# Patient Record
Sex: FEMALE | URBAN - METROPOLITAN AREA
[De-identification: names, ages, dates, MRNs, and addresses within clinical notes are randomized per-mention and may not be internally consistent; named-entity substitution may affect disease eponyms.]

---

## 2017-06-05 ENCOUNTER — APPOINTMENT (RX ONLY)
Dept: URBAN - METROPOLITAN AREA CLINIC 81 | Facility: CLINIC | Age: 32
Setting detail: DERMATOLOGY
End: 2017-06-05

## 2017-06-05 DIAGNOSIS — L81.4 OTHER MELANIN HYPERPIGMENTATION: ICD-10-CM

## 2017-06-05 DIAGNOSIS — L91.0 HYPERTROPHIC SCAR: ICD-10-CM

## 2017-06-05 DIAGNOSIS — L81.6 OTHER DISORDERS OF DIMINISHED MELANIN FORMATION: ICD-10-CM

## 2017-06-05 DIAGNOSIS — T07XXXA ABRASION OR FRICTION BURN OF OTHER, MULTIPLE, AND UNSPECIFIED SITES, WITHOUT MENTION OF INFECTION: ICD-10-CM

## 2017-06-05 DIAGNOSIS — D22 MELANOCYTIC NEVI: ICD-10-CM

## 2017-06-05 DIAGNOSIS — Z41.9 ENCOUNTER FOR PROCEDURE FOR PURPOSES OTHER THAN REMEDYING HEALTH STATE, UNSPECIFIED: ICD-10-CM

## 2017-06-05 PROBLEM — T14.8 OTHER INJURY OF UNSPECIFIED BODY REGION: Status: ACTIVE | Noted: 2017-06-05

## 2017-06-05 PROBLEM — D22.5 MELANOCYTIC NEVI OF TRUNK: Status: ACTIVE | Noted: 2017-06-05

## 2017-06-05 PROCEDURE — ? MEDICAL CONSULTATION: Q SWITCHED LASER

## 2017-06-05 PROCEDURE — 99202 OFFICE O/P NEW SF 15 MIN: CPT

## 2017-06-05 PROCEDURE — ? TREATMENT REGIMEN

## 2017-06-05 PROCEDURE — ? COUNSELING

## 2017-06-05 PROCEDURE — ? OTHER (COSMETIC)

## 2017-06-05 PROCEDURE — ? MEDICAL CONSULTATION: PULSED-DYE LASER

## 2017-06-05 ASSESSMENT — LOCATION DETAILED DESCRIPTION DERM
LOCATION DETAILED: LEFT UPPER CUTANEOUS LIP
LOCATION DETAILED: INFERIOR MID FOREHEAD
LOCATION DETAILED: LEFT LATERAL BUCCAL CHEEK
LOCATION DETAILED: RIGHT LATERAL ABDOMEN

## 2017-06-05 ASSESSMENT — LOCATION SIMPLE DESCRIPTION DERM
LOCATION SIMPLE: LEFT CHEEK
LOCATION SIMPLE: ABDOMEN
LOCATION SIMPLE: INFERIOR FOREHEAD
LOCATION SIMPLE: LEFT LIP

## 2017-06-05 ASSESSMENT — LOCATION ZONE DERM
LOCATION ZONE: FACE
LOCATION ZONE: LIP
LOCATION ZONE: TRUNK

## 2017-06-05 NOTE — PROCEDURE: TREATMENT REGIMEN
Detail Level: Zone
Plan: The patient will have the site treated with the pulse dye laser and then treat with scar gel.\\nPatient to schedule appointment

## 2017-06-05 NOTE — HPI: SECONDARY COMPLAINT
How Severe Is This Condition?: mild
How Severe Is This Condition?: moderate
Additional History: the patient states she finds herself scratching  and picking at the areas

## 2017-06-05 NOTE — HPI: COSMETIC CONSULTATION
When Outside In The Sun, Do You...: mostly tans, rarely burns
Additional History: the patient  is here for treatment to a previously biopsied congenital nevus years ago, and was also treated with Yag laser.  She states the lesion has darkened.
Additional History: the area is a hy giving birthpopigmented area located on the left jaw that occurred after

## 2017-08-09 ENCOUNTER — APPOINTMENT (RX ONLY)
Dept: URBAN - METROPOLITAN AREA CLINIC 81 | Facility: CLINIC | Age: 32
Setting detail: DERMATOLOGY
End: 2017-08-09

## 2017-08-09 DIAGNOSIS — L91.0 HYPERTROPHIC SCAR: ICD-10-CM

## 2017-08-09 DIAGNOSIS — L81.4 OTHER MELANIN HYPERPIGMENTATION: ICD-10-CM

## 2017-08-09 PROCEDURE — ? PULSED-DYE LASER

## 2017-08-09 PROCEDURE — ? Q-SWITCHED LASER

## 2017-08-09 ASSESSMENT — LOCATION ZONE DERM
LOCATION ZONE: LIP
LOCATION ZONE: LIP

## 2017-08-09 ASSESSMENT — LOCATION SIMPLE DESCRIPTION DERM
LOCATION SIMPLE: LEFT LIP
LOCATION SIMPLE: LEFT LIP

## 2017-08-09 ASSESSMENT — LOCATION DETAILED DESCRIPTION DERM
LOCATION DETAILED: LEFT UPPER CUTANEOUS LIP
LOCATION DETAILED: LEFT UPPER CUTANEOUS LIP

## 2017-08-09 NOTE — PROCEDURE: PULSED-DYE LASER
Post-Procedure Care: Vaseline and ice applied. Post care reviewed with patient.
Cryogen Time (Ms): 30
Pulse Duration: 10 ms
Cryogen Time (Ms): 30
Delay Time (Ms): 20
Detail Level: Detailed
Laser Type: Vbeam 595nm
Pulse Count (Optional): 3
Consent: Written consent obtained, risks reviewed including but not limited to crusting, scabbing, blistering, scarring, darker or lighter pigmentary change, incidental hair removal, bruising, and/or incomplete removal.
Spot Size: 7 mm
Pulse Duration: 6 ms
Delay Time (Ms): 20
Location Override: left upper cutaneous lip
Post-Care Instructions: I reviewed with the patient in detail post-care instructions. Patient should stay away from the sun and wear sun protection until treated areas are fully healed.
Fluence In J/Cm2 (Optional): 7

## 2017-08-09 NOTE — PROCEDURE: Q-SWITCHED LASER
Frequency In Hz: 1
Spot Size In Mm: 2
Detail Level: Detailed
Spot Size In Mm: 3
Consent: Written consent obtained, risks reviewed including but not limited to crusting, scabbing, blistering, scarring, darker or lighter pigmentary change, systemic reactions, ulceration, incidental hair removal, bruising, and/or incomplete removal.
Endpoint: Immediate endpoint whitening and pinpoint bleeding. Vaseline and ice applied. Post care reviewed with patient.
Fluence: 3.5
Area In Cm^2: 0
Post-Care Instructions: I reviewed with the patient in detail post-care instructions. Patient should avoid sunlight and wear sun protection.
Anesthesia Type: 1% lidocaine with epinephrine

## 2017-10-12 ENCOUNTER — APPOINTMENT (RX ONLY)
Dept: URBAN - METROPOLITAN AREA CLINIC 81 | Facility: CLINIC | Age: 32
Setting detail: DERMATOLOGY
End: 2017-10-12

## 2017-10-12 DIAGNOSIS — L81.4 OTHER MELANIN HYPERPIGMENTATION: ICD-10-CM

## 2017-10-12 DIAGNOSIS — L91.0 HYPERTROPHIC SCAR: ICD-10-CM

## 2017-10-12 PROBLEM — D23.39 OTHER BENIGN NEOPLASM OF SKIN OF OTHER PARTS OF FACE: Status: ACTIVE | Noted: 2017-10-12

## 2017-10-12 PROCEDURE — ? COUNSELING

## 2017-10-12 PROCEDURE — ? TREATMENT REGIMEN

## 2017-10-12 ASSESSMENT — LOCATION ZONE DERM: LOCATION ZONE: LIP

## 2017-10-12 ASSESSMENT — LOCATION DETAILED DESCRIPTION DERM: LOCATION DETAILED: LEFT UPPER CUTANEOUS LIP

## 2017-10-12 ASSESSMENT — LOCATION SIMPLE DESCRIPTION DERM: LOCATION SIMPLE: LEFT LIP

## 2017-10-12 NOTE — PROCEDURE: TREATMENT REGIMEN
Otc Regimen: Gentle cleanser face wash
Plan: Will use hydroquinone if pigmentation persists.  Strongly recommended sun protection.
Detail Level: Zone
Samples Given: Nicho two times a day

## 2017-11-09 ENCOUNTER — APPOINTMENT (RX ONLY)
Dept: URBAN - METROPOLITAN AREA CLINIC 81 | Facility: CLINIC | Age: 32
Setting detail: DERMATOLOGY
End: 2017-11-09

## 2017-11-09 DIAGNOSIS — L81.4 OTHER MELANIN HYPERPIGMENTATION: ICD-10-CM

## 2017-11-09 DIAGNOSIS — L91.0 HYPERTROPHIC SCAR: ICD-10-CM

## 2017-11-09 PROCEDURE — ? COUNSELING

## 2017-11-09 PROCEDURE — ? TREATMENT REGIMEN

## 2017-11-09 ASSESSMENT — LOCATION SIMPLE DESCRIPTION DERM: LOCATION SIMPLE: LEFT LIP

## 2017-11-09 ASSESSMENT — SEVERITY ASSESSMENT: SEVERITY: MILD

## 2017-11-09 ASSESSMENT — SCAR ASSESSEMENT OVERALL: SCAR ASSESSMENT: 1 (FLAT SCAR, NO ERYTHEMA)

## 2017-11-09 ASSESSMENT — LOCATION ZONE DERM: LOCATION ZONE: LIP

## 2017-11-09 ASSESSMENT — LOCATION DETAILED DESCRIPTION DERM: LOCATION DETAILED: LEFT UPPER CUTANEOUS LIP

## 2017-11-09 NOTE — PROCEDURE: TREATMENT REGIMEN
Otc Regimen: Gentle cleanser face wash
Plan: Strongly recommended sun protection.\\n\\nAlso discussed Fraxel laser treatment
Discontinue Regimen: Elidel cream
Initiate Treatment: Hydroquinone 4% with Green tree extract apply QHS 15 gm with 1 refill to [facility]
Detail Level: Zone

## 2018-01-18 ENCOUNTER — APPOINTMENT (RX ONLY)
Dept: URBAN - METROPOLITAN AREA CLINIC 81 | Facility: CLINIC | Age: 33
Setting detail: DERMATOLOGY
End: 2018-01-18

## 2018-01-18 DIAGNOSIS — L90.5 SCAR CONDITIONS AND FIBROSIS OF SKIN: ICD-10-CM

## 2018-01-18 DIAGNOSIS — L81.4 OTHER MELANIN HYPERPIGMENTATION: ICD-10-CM

## 2018-01-18 PROBLEM — D23.39 OTHER BENIGN NEOPLASM OF SKIN OF OTHER PARTS OF FACE: Status: ACTIVE | Noted: 2018-01-18

## 2018-01-18 PROCEDURE — ? BENIGN DESTRUCTION

## 2018-01-18 PROCEDURE — 17110 DESTRUCTION B9 LES UP TO 14: CPT

## 2018-01-18 PROCEDURE — ? TREATMENT REGIMEN

## 2018-01-18 PROCEDURE — 99213 OFFICE O/P EST LOW 20 MIN: CPT | Mod: 25

## 2018-01-18 PROCEDURE — ? COUNSELING

## 2018-01-18 ASSESSMENT — LOCATION ZONE DERM
LOCATION ZONE: FACE
LOCATION ZONE: LIP
LOCATION ZONE: FACE

## 2018-01-18 ASSESSMENT — LOCATION DETAILED DESCRIPTION DERM
LOCATION DETAILED: LEFT UPPER CUTANEOUS LIP
LOCATION DETAILED: LEFT INFERIOR MEDIAL MALAR CHEEK
LOCATION DETAILED: LEFT SUPERIOR CENTRAL BUCCAL CHEEK
LOCATION DETAILED: LEFT INFERIOR MEDIAL MALAR CHEEK

## 2018-01-18 ASSESSMENT — LOCATION SIMPLE DESCRIPTION DERM
LOCATION SIMPLE: LEFT CHEEK
LOCATION SIMPLE: LEFT CHEEK
LOCATION SIMPLE: LEFT LIP

## 2018-01-18 NOTE — PROCEDURE: TREATMENT REGIMEN
Detail Level: Zone
Plan: DC hydroquinone\\nDiscussed Fraxel laser 1929 nm
Plan: Stratoderm scar cream

## 2018-01-18 NOTE — PROCEDURE: BENIGN DESTRUCTION
Consent: The patient's consent was obtained including but not limited to risks of crusting, scabbing, blistering, scarring, darker or lighter pigmentary change, recurrence, incomplete removal and infection.
Treatment Number (Will Not Render If 0): 0
Render Post-Care Instructions In Note?: no
Medical Necessity Information: It is in your best interest to select a reason for this procedure from the list below. All of these items fulfill various CMS LCD requirements except the new and changing color options.
Detail Level: Detailed
Medical Necessity Clause: This procedure was medically necessary because the lesions that were treated were:
Anesthesia Volume In Cc: 0.5
Post-Care Instructions: I reviewed with the patient in detail post-care instructions. Patient is to wear sunprotection, and avoid picking at any of the treated lesions. Pt may apply Vaseline to crusted or scabbing areas.

## 2018-03-01 ENCOUNTER — APPOINTMENT (RX ONLY)
Dept: URBAN - METROPOLITAN AREA CLINIC 81 | Facility: CLINIC | Age: 33
Setting detail: DERMATOLOGY
End: 2018-03-01

## 2018-03-01 DIAGNOSIS — L90.5 SCAR CONDITIONS AND FIBROSIS OF SKIN: ICD-10-CM

## 2018-03-01 DIAGNOSIS — L81.4 OTHER MELANIN HYPERPIGMENTATION: ICD-10-CM

## 2018-03-01 PROCEDURE — 99212 OFFICE O/P EST SF 10 MIN: CPT

## 2018-03-01 PROCEDURE — ? TREATMENT REGIMEN

## 2018-03-01 ASSESSMENT — LOCATION ZONE DERM
LOCATION ZONE: FACE
LOCATION ZONE: LIP
LOCATION ZONE: FACE

## 2018-03-01 ASSESSMENT — LOCATION SIMPLE DESCRIPTION DERM
LOCATION SIMPLE: LEFT LIP
LOCATION SIMPLE: LEFT CHEEK
LOCATION SIMPLE: LEFT CHEEK

## 2018-03-01 ASSESSMENT — LOCATION DETAILED DESCRIPTION DERM
LOCATION DETAILED: LEFT SUPERIOR CENTRAL BUCCAL CHEEK
LOCATION DETAILED: LEFT INFERIOR MEDIAL MALAR CHEEK
LOCATION DETAILED: LEFT UPPER CUTANEOUS LIP
LOCATION DETAILED: LEFT INFERIOR MEDIAL MALAR CHEEK

## 2018-03-01 NOTE — PROCEDURE: TREATMENT REGIMEN
Plan: Discussed Fraxel laser 8389 nm
Continue Regimen: Hydroquinone 4% w/ green tea extract 15 gm QHS for 30 days.
Detail Level: Simple
Continue Regimen: Stratoderm scar cream 30 days.

## 2018-04-03 ENCOUNTER — APPOINTMENT (RX ONLY)
Dept: URBAN - METROPOLITAN AREA CLINIC 81 | Facility: CLINIC | Age: 33
Setting detail: DERMATOLOGY
End: 2018-04-03

## 2018-04-03 DIAGNOSIS — L90.5 SCAR CONDITIONS AND FIBROSIS OF SKIN: ICD-10-CM

## 2018-04-03 DIAGNOSIS — L81.4 OTHER MELANIN HYPERPIGMENTATION: ICD-10-CM

## 2018-04-03 PROCEDURE — ? FRAXEL

## 2018-04-03 PROCEDURE — ? TREATMENT REGIMEN

## 2018-04-03 ASSESSMENT — LOCATION DETAILED DESCRIPTION DERM
LOCATION DETAILED: LEFT UPPER CUTANEOUS LIP
LOCATION DETAILED: LEFT INFERIOR MEDIAL MALAR CHEEK
LOCATION DETAILED: LEFT SUPERIOR CENTRAL BUCCAL CHEEK
LOCATION DETAILED: LEFT UPPER CUTANEOUS LIP
LOCATION DETAILED: RIGHT UPPER CUTANEOUS LIP

## 2018-04-03 ASSESSMENT — LOCATION SIMPLE DESCRIPTION DERM
LOCATION SIMPLE: LEFT CHEEK
LOCATION SIMPLE: LEFT LIP
LOCATION SIMPLE: LEFT CHEEK
LOCATION SIMPLE: LEFT LIP
LOCATION SIMPLE: RIGHT LIP

## 2018-04-03 ASSESSMENT — LOCATION ZONE DERM
LOCATION ZONE: FACE
LOCATION ZONE: LIP
LOCATION ZONE: LIP
LOCATION ZONE: FACE

## 2018-04-03 NOTE — PROCEDURE: FRAXEL
Number Of Passes: 1
External Cooling: Dolly Cryo 5
Detail Level: Zone
Number Of Passes: 8
Add Post-Care Below To The Note: No
Topical Anesthesia Type: 20% benzocaine, 8% lidocaine, 4% tetracaine
Energy(Mj/Cm2): 10
External Cooling Fan Speed: 5
Post-Care Instructions: I reviewed with the patient in detail post-care instructions. Patient should avoid sun until area fully healed.
Wavelength: 1927nm
Consent: Written consent obtained, risks reviewed including but not limited to pain and incomplete improvement.
Location: upper cutaneous lip
Depth In Microns (Use Numbers Only, No Special Characters Or $): 196
Total Energy In Kj (Optional- Don't Include Units): .06
Length Of Topical Anesthesia Application (Optional): 30 minutes
Anesthesia Type: 1% lidocaine with epinephrine
Total Coverage: 35%
Indication: traumatic scar
Treatment Level: 4

## 2023-05-22 ENCOUNTER — OFFICE VISIT (OUTPATIENT)
Age: 38
End: 2023-05-22
Payer: COMMERCIAL

## 2023-05-22 ENCOUNTER — NURSE ONLY (OUTPATIENT)
Age: 38
End: 2023-05-22

## 2023-05-22 VITALS
DIASTOLIC BLOOD PRESSURE: 69 MMHG | WEIGHT: 142 LBS | BODY MASS INDEX: 25.16 KG/M2 | TEMPERATURE: 98.3 F | RESPIRATION RATE: 18 BRPM | HEART RATE: 61 BPM | HEIGHT: 63 IN | OXYGEN SATURATION: 97 % | SYSTOLIC BLOOD PRESSURE: 128 MMHG

## 2023-05-22 DIAGNOSIS — K21.9 GASTROESOPHAGEAL REFLUX DISEASE, UNSPECIFIED WHETHER ESOPHAGITIS PRESENT: ICD-10-CM

## 2023-05-22 DIAGNOSIS — F41.9 ANXIETY: ICD-10-CM

## 2023-05-22 DIAGNOSIS — Z13.0 SCREENING FOR ENDOCRINE, METABOLIC AND IMMUNITY DISORDER: ICD-10-CM

## 2023-05-22 DIAGNOSIS — E55.9 VITAMIN D DEFICIENCY: ICD-10-CM

## 2023-05-22 DIAGNOSIS — Z12.4 SCREENING FOR CERVICAL CANCER: Primary | ICD-10-CM

## 2023-05-22 DIAGNOSIS — R53.83 OTHER FATIGUE: ICD-10-CM

## 2023-05-22 DIAGNOSIS — Z13.29 SCREENING FOR ENDOCRINE, METABOLIC AND IMMUNITY DISORDER: ICD-10-CM

## 2023-05-22 DIAGNOSIS — Z13.228 SCREENING FOR ENDOCRINE, METABOLIC AND IMMUNITY DISORDER: ICD-10-CM

## 2023-05-22 DIAGNOSIS — E53.8 B12 DEFICIENCY: ICD-10-CM

## 2023-05-22 PROCEDURE — 99203 OFFICE O/P NEW LOW 30 MIN: CPT | Performed by: INTERNAL MEDICINE

## 2023-05-22 RX ORDER — SERTRALINE HYDROCHLORIDE 100 MG/1
100 TABLET, FILM COATED ORAL DAILY
COMMUNITY

## 2023-05-22 RX ORDER — OMEPRAZOLE 20 MG/1
20 TABLET, DELAYED RELEASE ORAL DAILY
Qty: 90 TABLET | Refills: 1 | Status: SHIPPED | OUTPATIENT
Start: 2023-05-22 | End: 2023-08-20

## 2023-05-22 RX ORDER — ALPRAZOLAM 0.5 MG/1
0.5 TABLET ORAL AS NEEDED
COMMUNITY

## 2023-05-22 SDOH — ECONOMIC STABILITY: HOUSING INSECURITY
IN THE LAST 12 MONTHS, WAS THERE A TIME WHEN YOU DID NOT HAVE A STEADY PLACE TO SLEEP OR SLEPT IN A SHELTER (INCLUDING NOW)?: NO

## 2023-05-22 SDOH — ECONOMIC STABILITY: INCOME INSECURITY: HOW HARD IS IT FOR YOU TO PAY FOR THE VERY BASICS LIKE FOOD, HOUSING, MEDICAL CARE, AND HEATING?: NOT HARD AT ALL

## 2023-05-22 SDOH — ECONOMIC STABILITY: FOOD INSECURITY: WITHIN THE PAST 12 MONTHS, THE FOOD YOU BOUGHT JUST DIDN'T LAST AND YOU DIDN'T HAVE MONEY TO GET MORE.: NEVER TRUE

## 2023-05-22 SDOH — ECONOMIC STABILITY: FOOD INSECURITY: WITHIN THE PAST 12 MONTHS, YOU WORRIED THAT YOUR FOOD WOULD RUN OUT BEFORE YOU GOT MONEY TO BUY MORE.: NEVER TRUE

## 2023-05-22 ASSESSMENT — PATIENT HEALTH QUESTIONNAIRE - PHQ9
2. FEELING DOWN, DEPRESSED OR HOPELESS: 0
SUM OF ALL RESPONSES TO PHQ QUESTIONS 1-9: 0
SUM OF ALL RESPONSES TO PHQ9 QUESTIONS 1 & 2: 0
SUM OF ALL RESPONSES TO PHQ QUESTIONS 1-9: 0
1. LITTLE INTEREST OR PLEASURE IN DOING THINGS: 0

## 2023-05-22 ASSESSMENT — ENCOUNTER SYMPTOMS
RESPIRATORY NEGATIVE: 1
EYES NEGATIVE: 1
GASTROINTESTINAL NEGATIVE: 1
ALLERGIC/IMMUNOLOGIC NEGATIVE: 1

## 2023-05-22 NOTE — PROGRESS NOTES
Subjective:   Monae Luevano was seen today for New Patient    Patient is here for establishing care. Patient does not have any symptoms suggestive of cardiac/pulmonary/ conditions. Patient has a history of anxiety for more than 20 years. Patient follows with psych, stable on Zoloft 100 mg once a day. Patient also has a history of GERD, takes Prilosec on and off in evening. Patient has a strong family history of lung CA in grandfather and grandmother. Patient smokes marijuana 1 joint per day. Does not consume alcohol. Patient works at [employer], exercises 3 to 4 days a week. Patient received 1 series injection of COVID-vaccine. Past Medical History:   Diagnosis Date    ADHD     Anxiety     Depression        Past Surgical History:   Procedure Laterality Date    CHOLECYSTECTOMY         Family History   Problem Relation Age of Onset    Alcohol Abuse Mother        Social History     Socioeconomic History    Marital status: Single     Spouse name: Not on file    Number of children: Not on file    Years of education: Not on file    Highest education level: Not on file   Occupational History    Not on file   Tobacco Use    Smoking status: Never    Smokeless tobacco: Never   Vaping Use    Vaping Use: Never used   Substance and Sexual Activity    Alcohol use: Not Currently    Drug use: Never    Sexual activity: Yes     Partners: Male   Other Topics Concern    Not on file   Social History Narrative    Not on file     Social Determinants of Health     Financial Resource Strain: Low Risk     Difficulty of Paying Living Expenses: Not hard at all   Food Insecurity: No Food Insecurity    Worried About Running Out of Food in the Last Year: Never true    [address] in the Last Year: Never true   Transportation Needs: Unknown    Lack of Transportation (Medical): Not on file    Lack of Transportation (Non-Medical):  No   Physical Activity: Not on file   Stress: Not on file   Social Connections: Not on file

## 2023-05-22 NOTE — PROGRESS NOTES
Kelsie Centeno presents today for   Chief Complaint   Patient presents with    New Patient                 1. \"Have you been to the ER, urgent care clinic since your last visit? Hospitalized since your last visit? \" no    2. \"Have you seen or consulted any other health care providers outside of the [address] [place] [address] since your last visit? \" no     3. For patients aged 39-70: Has the patient had a colonoscopy / FIT/ Cologuard? NA - based on age      If the patient is female:    4. For patients aged 41-77: Has the patient had a mammogram within the past 2 years? NA - based on age or sex      11. For patients aged 21-65: Has the patient had a pap smear? Yes - Care Gap present.  Rooming MA/LPN to request most recent results

## 2023-05-23 LAB
25(OH)D3+25(OH)D2 SERPL-MCNC: 21.5 NG/ML (ref 30–100)
ALBUMIN SERPL-MCNC: 5.1 G/DL (ref 3.8–4.8)
ALBUMIN/GLOB SERPL: 1.8 {RATIO} (ref 1.2–2.2)
ALP SERPL-CCNC: 65 IU/L (ref 44–121)
ALT SERPL-CCNC: 10 IU/L (ref 0–32)
AST SERPL-CCNC: 12 IU/L (ref 0–40)
BASOPHILS # BLD AUTO: 0.1 X10E3/UL (ref 0–0.2)
BASOPHILS NFR BLD AUTO: 1 %
BILIRUB SERPL-MCNC: <0.2 MG/DL (ref 0–1.2)
BUN SERPL-MCNC: 6 MG/DL (ref 6–20)
BUN/CREAT SERPL: 10 (ref 9–23)
CALCIUM SERPL-MCNC: 9.7 MG/DL (ref 8.7–10.2)
CHLORIDE SERPL-SCNC: 99 MMOL/L (ref 96–106)
CHOLEST SERPL-MCNC: 168 MG/DL (ref 100–199)
CO2 SERPL-SCNC: 24 MMOL/L (ref 20–29)
CREAT SERPL-MCNC: 0.61 MG/DL (ref 0.57–1)
EGFRCR SERPLBLD CKD-EPI 2021: 117 ML/MIN/1.73
EOSINOPHIL # BLD AUTO: 0.3 X10E3/UL (ref 0–0.4)
EOSINOPHIL NFR BLD AUTO: 4 %
ERYTHROCYTE [DISTWIDTH] IN BLOOD BY AUTOMATED COUNT: 12.8 % (ref 11.7–15.4)
GLOBULIN SER CALC-MCNC: 2.8 G/DL (ref 1.5–4.5)
GLUCOSE SERPL-MCNC: 83 MG/DL (ref 70–99)
HBA1C MFR BLD: 5.3 % (ref 4.8–5.6)
HCT VFR BLD AUTO: 42.1 % (ref 34–46.6)
HDLC SERPL-MCNC: 53 MG/DL
HGB BLD-MCNC: 14 G/DL (ref 11.1–15.9)
IMM GRANULOCYTES # BLD AUTO: 0 X10E3/UL (ref 0–0.1)
IMM GRANULOCYTES NFR BLD AUTO: 0 %
LDLC SERPL CALC-MCNC: 88 MG/DL (ref 0–99)
LYMPHOCYTES # BLD AUTO: 2.5 X10E3/UL (ref 0.7–3.1)
LYMPHOCYTES NFR BLD AUTO: 36 %
MCH RBC QN AUTO: 29 PG (ref 26.6–33)
MCHC RBC AUTO-ENTMCNC: 33.3 G/DL (ref 31.5–35.7)
MCV RBC AUTO: 87 FL (ref 79–97)
MONOCYTES # BLD AUTO: 0.6 X10E3/UL (ref 0.1–0.9)
MONOCYTES NFR BLD AUTO: 8 %
NEUTROPHILS # BLD AUTO: 3.5 X10E3/UL (ref 1.4–7)
NEUTROPHILS NFR BLD AUTO: 51 %
PLATELET # BLD AUTO: 304 X10E3/UL (ref 150–450)
POTASSIUM SERPL-SCNC: 4.5 MMOL/L (ref 3.5–5.2)
PROT SERPL-MCNC: 7.9 G/DL (ref 6–8.5)
RBC # BLD AUTO: 4.82 X10E6/UL (ref 3.77–5.28)
SODIUM SERPL-SCNC: 138 MMOL/L (ref 134–144)
T4 FREE SERPL-MCNC: 1.54 NG/DL (ref 0.82–1.77)
TRIGL SERPL-MCNC: 158 MG/DL (ref 0–149)
TSH SERPL DL<=0.005 MIU/L-ACNC: 1.13 UIU/ML (ref 0.45–4.5)
VIT B12 SERPL-MCNC: 1442 PG/ML (ref 232–1245)
VLDLC SERPL CALC-MCNC: 27 MG/DL (ref 5–40)
WBC # BLD AUTO: 6.9 X10E3/UL (ref 3.4–10.8)

## 2023-06-21 PROBLEM — Z12.4 SCREENING FOR CERVICAL CANCER: Status: RESOLVED | Noted: 2023-05-22 | Resolved: 2023-06-21

## 2023-06-21 PROBLEM — Z13.0 SCREENING FOR ENDOCRINE, METABOLIC AND IMMUNITY DISORDER: Status: RESOLVED | Noted: 2023-05-22 | Resolved: 2023-06-21

## 2023-06-21 PROBLEM — Z13.228 SCREENING FOR ENDOCRINE, METABOLIC AND IMMUNITY DISORDER: Status: RESOLVED | Noted: 2023-05-22 | Resolved: 2023-06-21

## 2023-06-21 PROBLEM — Z13.29 SCREENING FOR ENDOCRINE, METABOLIC AND IMMUNITY DISORDER: Status: RESOLVED | Noted: 2023-05-22 | Resolved: 2023-06-21

## 2023-11-20 ENCOUNTER — OFFICE VISIT (OUTPATIENT)
Age: 38
End: 2023-11-20
Payer: MEDICAID

## 2023-11-20 VITALS
SYSTOLIC BLOOD PRESSURE: 120 MMHG | WEIGHT: 145 LBS | DIASTOLIC BLOOD PRESSURE: 81 MMHG | RESPIRATION RATE: 18 BRPM | BODY MASS INDEX: 25.69 KG/M2 | OXYGEN SATURATION: 99 % | TEMPERATURE: 97 F | HEART RATE: 104 BPM | HEIGHT: 63 IN

## 2023-11-20 DIAGNOSIS — E55.9 VITAMIN D DEFICIENCY: ICD-10-CM

## 2023-11-20 DIAGNOSIS — G56.01 CARPAL TUNNEL SYNDROME OF RIGHT WRIST: Primary | ICD-10-CM

## 2023-11-20 PROCEDURE — 99214 OFFICE O/P EST MOD 30 MIN: CPT | Performed by: INTERNAL MEDICINE

## 2023-11-20 RX ORDER — ERGOCALCIFEROL 1.25 MG/1
50000 CAPSULE ORAL WEEKLY
Qty: 12 CAPSULE | Refills: 1 | Status: SHIPPED | OUTPATIENT
Start: 2023-11-20

## 2023-11-20 SDOH — ECONOMIC STABILITY: FOOD INSECURITY: WITHIN THE PAST 12 MONTHS, YOU WORRIED THAT YOUR FOOD WOULD RUN OUT BEFORE YOU GOT MONEY TO BUY MORE.: NEVER TRUE

## 2023-11-20 SDOH — ECONOMIC STABILITY: FOOD INSECURITY: WITHIN THE PAST 12 MONTHS, THE FOOD YOU BOUGHT JUST DIDN'T LAST AND YOU DIDN'T HAVE MONEY TO GET MORE.: NEVER TRUE

## 2023-11-20 SDOH — ECONOMIC STABILITY: INCOME INSECURITY: HOW HARD IS IT FOR YOU TO PAY FOR THE VERY BASICS LIKE FOOD, HOUSING, MEDICAL CARE, AND HEATING?: NOT HARD AT ALL

## 2023-11-20 ASSESSMENT — PATIENT HEALTH QUESTIONNAIRE - PHQ9
1. LITTLE INTEREST OR PLEASURE IN DOING THINGS: 0
SUM OF ALL RESPONSES TO PHQ QUESTIONS 1-9: 0
2. FEELING DOWN, DEPRESSED OR HOPELESS: 0
SUM OF ALL RESPONSES TO PHQ9 QUESTIONS 1 & 2: 0
SUM OF ALL RESPONSES TO PHQ QUESTIONS 1-9: 0

## 2023-11-20 NOTE — PROGRESS NOTES
Jose Lou presents today for No chief complaint on file. C/o loosing strength in right hand            1. \"Have you been to the ER, urgent care clinic since your last visit? Hospitalized since your last visit? \" no    2. \"Have you seen or consulted any other health care providers outside of the [address] [place] [address] since your last visit? \" no     3. For patients aged 43-73: Has the patient had a colonoscopy / FIT/ Cologuard? NA - based on age      If the patient is female:    4. For patients aged 43-66: Has the patient had a mammogram within the past 2 years? NA - based on age or sex      11. For patients aged 21-65: Has the patient had a pap smear?  Yes - no Care Gap present

## 2023-12-11 ENCOUNTER — TELEPHONE (OUTPATIENT)
Age: 38
End: 2023-12-11

## 2023-12-11 NOTE — TELEPHONE ENCOUNTER
Patient called and stated she was reaching into the fridge at work on 12/7/23 and she said she began getting pain in her shoulder and left hand shortly after. Patient states that she now has numbness in that hand as well. Patient states the pain is keeping her awake at night in the left hand. Patient has an appointment with Dr. Karthik Lorenz in January for her right hand but this is something new to her. Patient states she has tried motrin and tylenol for the pain. Please advise. Offered patient appointment with Dr. Fernando Yang for tomorrow but patient states that she is not sure that she can wait because she is in pain.

## 2023-12-11 NOTE — TELEPHONE ENCOUNTER
Patient states she hurt her arm Thursday and states she is losing the function of her arm and she is in so much pain. Patient was advised if she is unable to  her arm she should go to the ER for evaluation.

## 2023-12-21 PROBLEM — M54.12 CERVICAL RADICULOPATHY: Status: ACTIVE | Noted: 2023-12-21

## 2024-01-03 ENCOUNTER — HOSPITAL ENCOUNTER (EMERGENCY)
Facility: HOSPITAL | Age: 39
Discharge: HOME | End: 2024-01-03
Attending: EMERGENCY MEDICINE
Payer: MEDICAID

## 2024-01-03 VITALS
WEIGHT: 145 LBS | SYSTOLIC BLOOD PRESSURE: 128 MMHG | HEIGHT: 62 IN | HEART RATE: 91 BPM | BODY MASS INDEX: 26.68 KG/M2 | DIASTOLIC BLOOD PRESSURE: 75 MMHG | TEMPERATURE: 98 F | RESPIRATION RATE: 17 BRPM | OXYGEN SATURATION: 99 %

## 2024-01-03 DIAGNOSIS — J20.9 ACUTE BRONCHITIS, UNSPECIFIED ORGANISM: Primary | ICD-10-CM

## 2024-01-03 LAB
FLUAV AG NPH QL IA: NEGATIVE
FLUBV AG NOSE QL IA: NEGATIVE
SARS-COV-2 RDRP RESP QL NAA+PROBE: NOT DETECTED
SOURCE: NORMAL

## 2024-01-03 PROCEDURE — 6370000000 HC RX 637 (ALT 250 FOR IP): Performed by: EMERGENCY MEDICINE

## 2024-01-03 PROCEDURE — 87804 INFLUENZA ASSAY W/OPTIC: CPT

## 2024-01-03 PROCEDURE — 99283 EMERGENCY DEPT VISIT LOW MDM: CPT

## 2024-01-03 PROCEDURE — 87635 SARS-COV-2 COVID-19 AMP PRB: CPT

## 2024-01-03 RX ORDER — ALBUTEROL SULFATE 90 UG/1
2 AEROSOL, METERED RESPIRATORY (INHALATION) 4 TIMES DAILY PRN
Qty: 18 G | Refills: 0 | Status: SHIPPED | OUTPATIENT
Start: 2024-01-03

## 2024-01-03 RX ORDER — PREDNISONE 20 MG/1
60 TABLET ORAL
Status: COMPLETED | OUTPATIENT
Start: 2024-01-03 | End: 2024-01-03

## 2024-01-03 RX ORDER — IPRATROPIUM BROMIDE AND ALBUTEROL SULFATE 2.5; .5 MG/3ML; MG/3ML
1 SOLUTION RESPIRATORY (INHALATION)
Status: COMPLETED | OUTPATIENT
Start: 2024-01-03 | End: 2024-01-03

## 2024-01-03 RX ORDER — PREDNISONE 50 MG/1
50 TABLET ORAL DAILY
Qty: 5 TABLET | Refills: 0 | Status: SHIPPED | OUTPATIENT
Start: 2024-01-04 | End: 2024-01-09

## 2024-01-03 RX ORDER — BENZONATATE 100 MG/1
100 CAPSULE ORAL 2 TIMES DAILY PRN
Qty: 14 CAPSULE | Refills: 0 | Status: SHIPPED | OUTPATIENT
Start: 2024-01-03 | End: 2024-01-10

## 2024-01-03 RX ADMIN — PREDNISONE 60 MG: 20 TABLET ORAL at 09:31

## 2024-01-03 RX ADMIN — IPRATROPIUM BROMIDE AND ALBUTEROL SULFATE 1 DOSE: .5; 3 SOLUTION RESPIRATORY (INHALATION) at 09:31

## 2024-01-03 ASSESSMENT — PAIN - FUNCTIONAL ASSESSMENT: PAIN_FUNCTIONAL_ASSESSMENT: 0-10

## 2024-01-03 ASSESSMENT — PAIN SCALES - GENERAL: PAINLEVEL_OUTOF10: 3

## 2024-01-03 NOTE — ED PROVIDER NOTES
EMERGENCY DEPARTMENT HISTORY AND PHYSICAL EXAM      Date: 1/3/2024  Patient Name: Celsa Garcia      History of Presenting Illness     Chief Complaint   Patient presents with    Nasal Congestion    Cough       Location/Duration/Severity/Modifying factors   Chief Complaint   Patient presents with    Nasal Congestion    Cough       HPI:  Celsa Garcia is a 38 y.o. female with no significant past medical history who presents with myalgias, cough, nasal congestion. Pt  denies fever, chills, chest pain, vomiting, diarrhea.    PCP: Erick Kramer MD    No current facility-administered medications for this encounter.     Current Outpatient Medications   Medication Sig Dispense Refill    [START ON 1/4/2024] predniSONE (DELTASONE) 50 MG tablet Take 1 tablet by mouth daily for 5 days 5 tablet 0    albuterol sulfate HFA (VENTOLIN HFA) 108 (90 Base) MCG/ACT inhaler Inhale 2 puffs into the lungs 4 times daily as needed (wheezing, sob, cough) 18 g 0    benzonatate (TESSALON) 100 MG capsule Take 1 capsule by mouth 2 times daily as needed for Cough 14 capsule 0    omeprazole (PRILOSEC OTC) 20 MG tablet Take 1 tablet by mouth daily 30 tablet 3    acetaminophen (TYLENOL) 500 MG tablet Take 1 tablet by mouth every 6 hours as needed for Pain 30 tablet 1    vitamin D (ERGOCALCIFEROL) 1.25 MG (93090 UT) CAPS capsule Take 1 capsule by mouth once a week 12 capsule 1    sertraline (ZOLOFT) 100 MG tablet Take 1 tablet by mouth daily Patient is taking 75 mg Q D      ALPRAZolam (XANAX) 0.5 MG tablet Take 1 tablet by mouth as needed for Sleep.         Past History     Past Medical History:  Past Medical History:   Diagnosis Date    ADHD     Anxiety     Depression        Past Surgical History:  Past Surgical History:   Procedure Laterality Date    CHOLECYSTECTOMY         Family History:  Family History   Problem Relation Age of Onset    Alcohol Abuse Mother        Social History:  Social History     Tobacco Use    Smoking status: Never

## 2024-01-12 ENCOUNTER — OFFICE VISIT (OUTPATIENT)
Age: 39
End: 2024-01-12
Payer: MEDICAID

## 2024-01-12 VITALS
HEIGHT: 62 IN | HEART RATE: 86 BPM | OXYGEN SATURATION: 96 % | TEMPERATURE: 98 F | WEIGHT: 147 LBS | BODY MASS INDEX: 27.05 KG/M2

## 2024-01-12 DIAGNOSIS — M54.12 CERVICAL NEURITIS: Primary | ICD-10-CM

## 2024-01-12 DIAGNOSIS — M47.812 CERVICAL SPONDYLOSIS WITHOUT MYELOPATHY: ICD-10-CM

## 2024-01-12 DIAGNOSIS — S16.1XXA STRAIN OF NECK MUSCLE, INITIAL ENCOUNTER: ICD-10-CM

## 2024-01-12 PROCEDURE — 99204 OFFICE O/P NEW MOD 45 MIN: CPT | Performed by: PHYSICAL MEDICINE & REHABILITATION

## 2024-01-12 RX ORDER — NICOTINE POLACRILEX 4 MG/1
20 GUM, CHEWING ORAL DAILY
COMMUNITY
Start: 2024-01-07

## 2024-01-12 RX ORDER — IBUPROFEN 200 MG
200 TABLET ORAL EVERY 6 HOURS PRN
COMMUNITY

## 2024-01-12 NOTE — PROGRESS NOTES
[facility]  [address]  [place] [address]  Tel: [phone]  Fax: [phone]          INITIAL CONSULTATION      HISTORY OF PRESENT ILLNESS:  Celsa Garcia is a 38 y.o. female who is referred from Dr. Erick Kramer secondary to cervical radiculopathy. She rates her pain 2/10. Patient comes into the office with c/o neck pain radiating to the left elbow with paresthesia from the elbow to the hand, involves digits 2 and 3. She also notes distal RUE symptoms. She says her neck pain started in November 2022 and her LUE symptoms started in November 2023. Patient says her pain has been holding steady since the initial onset. She denies loss of balance, falls, or impairments manual dexterity. Her pain is exacerbated by no position. Patient denies recent fevers, weight loss, rashes, or skin sores. No stomach ulcers or bleeding disorders.No history of spinal surgery or injections. Patient denies recent physical therapy or chiropractic care. Pt denies DM.Patient reports that to her knowledge her kidneys function properly, GFR over 60 on 5/22/2023. Patient says to her knowledge she is not pregnant, trying to become pregnant, or breast feeding at this time.   Patient has taken prednisone x 2 since December 2023 without benefit, tylenol, and Motrin. PmHx of depression, anxiety. Note from Dr. Kramer dated 12/21/2023 indicating patient was seen with c/o left sided upper back pain radiating to the LUE to the hand. She has tried Motrin with some benefit. The previous CTS and right hand have improved. She was given prednisone. Referred to me. Cervical spine XR dated 12/11/2023 films independently reviewed by me. Per report, There is partial reversal of the cervical lordosis which may be positional or related to spasm. Degenerative changes are present with joint space narrowing and marginal osteophytic spurring. There is no prevertebral soft tissue swelling. No fractures or

## 2024-01-22 ENCOUNTER — OFFICE VISIT (OUTPATIENT)
Age: 39
End: 2024-01-22
Payer: MEDICAID

## 2024-01-22 VITALS — HEIGHT: 62 IN | WEIGHT: 150 LBS | BODY MASS INDEX: 27.6 KG/M2

## 2024-01-22 DIAGNOSIS — R20.0 BILATERAL HAND NUMBNESS: Primary | ICD-10-CM

## 2024-01-22 DIAGNOSIS — M54.12 CERVICAL NEURITIS: ICD-10-CM

## 2024-01-22 PROCEDURE — 99213 OFFICE O/P EST LOW 20 MIN: CPT | Performed by: ORTHOPAEDIC SURGERY

## 2024-01-22 NOTE — PROGRESS NOTES
Celsa Garcia is a 38 y.o. female right handed.  Worker's Compensation and legal considerations: none    Chief Complaint   Patient presents with    Hand Pain     Bilat      Pain Score:   5    HPI: Patient presents today as referral from spine due to bilateral hand numbness and tingling.  She is currently scheduled for an EMG.    Date of onset: Indeterminate  Injury: No  Prior Treatment:  No    ROS: Review of Systems - General ROS: negative except HPI    Past Medical History:   Diagnosis Date    ADHD     Anxiety     Depression        Past Surgical History:   Procedure Laterality Date    CHOLECYSTECTOMY          Current Outpatient Medications   Medication Sig Dispense Refill    ibuprofen (ADVIL;MOTRIN) 200 MG tablet Take 1 tablet by mouth every 6 hours as needed for Pain      albuterol sulfate HFA (VENTOLIN HFA) 108 (90 Base) MCG/ACT inhaler Inhale 2 puffs into the lungs 4 times daily as needed (wheezing, sob, cough) 18 g 0    omeprazole (PRILOSEC OTC) 20 MG tablet Take 1 tablet by mouth daily 30 tablet 3    acetaminophen (TYLENOL) 500 MG tablet Take 1 tablet by mouth every 6 hours as needed for Pain 30 tablet 1    vitamin D (ERGOCALCIFEROL) 1.25 MG (62528 UT) CAPS capsule Take 1 capsule by mouth once a week 12 capsule 1    sertraline (ZOLOFT) 100 MG tablet Take 1 tablet by mouth daily Patient is taking 75 mg Q D      ALPRAZolam (XANAX) 0.5 MG tablet Take 1 tablet by mouth as needed for Sleep.      omeprazole 20 MG EC tablet Take 1 tablet by mouth daily (Patient not taking: Reported on 1/12/2024)       No current facility-administered medications for this visit.       Allergies   Allergen Reactions    Meperidine Nausea And Vomiting     Other Reaction(s): gi distress    Meperidine Hcl Nausea Only         Ht 1.575 m (5' 2\")   Wt 68 kg (150 lb)   BMI 27.44 kg/m²   Physical Exam  Vitals and nursing note reviewed.   Constitutional:       General: She is not in acute distress.     Appearance: Normal appearance. She is not

## 2024-02-16 ENCOUNTER — PROCEDURE VISIT (OUTPATIENT)
Age: 39
End: 2024-02-16

## 2024-02-16 VITALS
SYSTOLIC BLOOD PRESSURE: 119 MMHG | HEIGHT: 62 IN | TEMPERATURE: 97.7 F | BODY MASS INDEX: 27.86 KG/M2 | RESPIRATION RATE: 18 BRPM | HEART RATE: 92 BPM | WEIGHT: 151.4 LBS | DIASTOLIC BLOOD PRESSURE: 77 MMHG

## 2024-02-16 DIAGNOSIS — M54.12 CERVICAL NEURITIS: ICD-10-CM

## 2024-02-16 PROBLEM — N39.0 URINARY TRACT INFECTIOUS DISEASE: Status: ACTIVE | Noted: 2024-02-16

## 2024-02-16 PROBLEM — K81.0 ACUTE CHOLECYSTITIS: Status: ACTIVE | Noted: 2020-04-08

## 2024-02-16 PROBLEM — R87.619: Status: ACTIVE | Noted: 2024-02-16

## 2024-02-16 PROBLEM — R42 DIZZINESS AND GIDDINESS: Status: ACTIVE | Noted: 2024-02-16

## 2024-02-16 PROBLEM — R30.0 DYSURIA: Status: ACTIVE | Noted: 2024-02-16

## 2024-02-16 PROBLEM — R07.0 PAIN IN THROAT: Status: ACTIVE | Noted: 2024-02-16

## 2024-02-16 PROBLEM — N83.201 CYST OF RIGHT OVARY: Status: ACTIVE | Noted: 2017-03-17

## 2024-02-16 PROBLEM — N91.2 AMENORRHEA: Status: ACTIVE | Noted: 2024-02-16

## 2024-02-16 PROBLEM — N94.10 DYSPAREUNIA IN FEMALE: Status: ACTIVE | Noted: 2017-03-16

## 2024-02-16 PROBLEM — O20.0 THREATENED MISCARRIAGE: Status: ACTIVE | Noted: 2024-02-16

## 2024-02-16 PROBLEM — O21.0 MILD HYPEREMESIS GRAVIDARUM: Status: ACTIVE | Noted: 2024-02-16

## 2024-02-16 PROBLEM — R10.2 PELVIC PAIN IN FEMALE: Status: ACTIVE | Noted: 2017-03-17

## 2024-02-16 RX ORDER — DEXTROAMPHETAMINE SACCHARATE, AMPHETAMINE ASPARTATE, DEXTROAMPHETAMINE SULFATE AND AMPHETAMINE SULFATE 5; 5; 5; 5 MG/1; MG/1; MG/1; MG/1
1 TABLET ORAL EVERY MORNING
COMMUNITY
Start: 2024-02-02

## 2024-02-16 NOTE — PROGRESS NOTES
Take 1 tablet by mouth every morning. Max Daily Amount: 1 tablet      omeprazole 20 MG EC tablet Take 1 tablet by mouth daily (Patient not taking: Reported on 1/12/2024)      ibuprofen (ADVIL;MOTRIN) 200 MG tablet Take 1 tablet by mouth every 6 hours as needed for Pain      albuterol sulfate HFA (VENTOLIN HFA) 108 (90 Base) MCG/ACT inhaler Inhale 2 puffs into the lungs 4 times daily as needed (wheezing, sob, cough) 18 g 0    omeprazole (PRILOSEC OTC) 20 MG tablet Take 1 tablet by mouth daily 30 tablet 3    acetaminophen (TYLENOL) 500 MG tablet Take 1 tablet by mouth every 6 hours as needed for Pain 30 tablet 1    vitamin D (ERGOCALCIFEROL) 1.25 MG (22191 UT) CAPS capsule Take 1 capsule by mouth once a week 12 capsule 1    sertraline (ZOLOFT) 100 MG tablet Take 1 tablet by mouth daily Patient is taking 75 mg Q D      ALPRAZolam (XANAX) 0.5 MG tablet Take 1 tablet by mouth as needed for Sleep.       No current facility-administered medications for this visit.        ALLERGIES  Allergies   Allergen Reactions    Meperidine Nausea And Vomiting     Other Reaction(s): gi distress    Meperidine Hcl Nausea Only          SOCIAL HISTORY    Social History     Socioeconomic History    Marital status: Single     Spouse name: None    Number of children: None    Years of education: None    Highest education level: None   Tobacco Use    Smoking status: Never    Smokeless tobacco: Never   Vaping Use    Vaping Use: Never used   Substance and Sexual Activity    Alcohol use: Not Currently    Drug use: Never    Sexual activity: Yes     Partners: Male     Social Determinants of Health     Financial Resource Strain: Low Risk  (12/21/2023)    Overall Financial Resource Strain (CARDIA)     Difficulty of Paying Living Expenses: Not hard at all   Food Insecurity: No Food Insecurity (12/21/2023)    Hunger Vital Sign     Worried About Running Out of Food in the Last Year: Never true     Ran Out of Food in the Last Year: Never true

## 2024-02-19 ENCOUNTER — OFFICE VISIT (OUTPATIENT)
Age: 39
End: 2024-02-19
Payer: MEDICAID

## 2024-02-19 VITALS
WEIGHT: 151 LBS | TEMPERATURE: 97.7 F | HEIGHT: 62 IN | SYSTOLIC BLOOD PRESSURE: 127 MMHG | HEART RATE: 80 BPM | BODY MASS INDEX: 27.79 KG/M2 | OXYGEN SATURATION: 98 % | DIASTOLIC BLOOD PRESSURE: 82 MMHG

## 2024-02-19 DIAGNOSIS — S16.1XXD STRAIN OF NECK MUSCLE, SUBSEQUENT ENCOUNTER: ICD-10-CM

## 2024-02-19 DIAGNOSIS — G56.01 CARPAL TUNNEL SYNDROME OF RIGHT WRIST: Primary | ICD-10-CM

## 2024-02-19 DIAGNOSIS — M54.12 CERVICAL NEURITIS: ICD-10-CM

## 2024-02-19 DIAGNOSIS — M47.812 CERVICAL SPONDYLOSIS WITHOUT MYELOPATHY: ICD-10-CM

## 2024-02-19 PROCEDURE — 99213 OFFICE O/P EST LOW 20 MIN: CPT | Performed by: PHYSICAL MEDICINE & REHABILITATION

## 2024-02-19 NOTE — PROGRESS NOTES
Unknown (12/21/2023)    Housing Stability Vital Sign     Unstable Housing in the Last Year: No       Current Outpatient Medications   Medication Sig Dispense Refill    amphetamine-dextroamphetamine (ADDERALL) 20 MG tablet Take 1 tablet by mouth every morning.      ibuprofen (ADVIL;MOTRIN) 200 MG tablet Take 1 tablet by mouth every 6 hours as needed for Pain      albuterol sulfate HFA (VENTOLIN HFA) 108 (90 Base) MCG/ACT inhaler Inhale 2 puffs into the lungs 4 times daily as needed (wheezing, sob, cough) 18 g 0    omeprazole (PRILOSEC OTC) 20 MG tablet Take 1 tablet by mouth daily 30 tablet 3    acetaminophen (TYLENOL) 500 MG tablet Take 1 tablet by mouth every 6 hours as needed for Pain 30 tablet 1    vitamin D (ERGOCALCIFEROL) 1.25 MG (57625 UT) CAPS capsule Take 1 capsule by mouth once a week 12 capsule 1    sertraline (ZOLOFT) 100 MG tablet Take 1 tablet by mouth daily Patient is taking 75 mg Q D      ALPRAZolam (XANAX) 0.5 MG tablet Take 1 tablet by mouth as needed for Sleep.      omeprazole 20 MG EC tablet Take 1 tablet by mouth daily (Patient not taking: Reported on 1/12/2024)       No current facility-administered medications for this visit.       Allergies   Allergen Reactions    Meperidine Nausea And Vomiting     Other Reaction(s): gi distress    Meperidine Hcl Nausea Only          PHYSICAL EXAMINATION    /82 (Site: Right Upper Arm, Position: Sitting, Cuff Size: Medium Adult)   Pulse 80   Temp 97.7 °F (36.5 °C) (Temporal)   Ht 1.575 m (5' 2\")   Wt 68.5 kg (151 lb)   SpO2 98%   BMI 27.62 kg/m²       CONSTITUTIONAL: NAD, A&O x 3    Byers's sign: Negative, Bilaterally    MOTOR:  Ambulates without an assistive device.       Shoulder AB/Flex Elbow Flex Wrist Ext Elbow Ext Wrist Flex Hand Intrin Tone   Right +4/5 +4/5 +4/5 +4/5 +4/5 +4/5 +4/5   Left +4/5 +4/5 +4/5 +4/5 +4/5 +4/5 +4/5     SENSATION: Decreased sensation to light touch on digits 2-4 on the left hand. Otherwise, sensation is intact to

## 2024-03-02 DIAGNOSIS — M54.12 CERVICAL RADICULOPATHY: ICD-10-CM

## 2024-03-04 RX ORDER — PREDNISONE 10 MG/1
TABLET ORAL
Qty: 20 TABLET | Refills: 0 | OUTPATIENT
Start: 2024-03-04

## 2024-03-04 NOTE — TELEPHONE ENCOUNTER
Last OV: 12/21/2023  No future appointment  Last refill: 1/4/2024      Please call patient for an appointment.

## 2024-03-11 ENCOUNTER — OFFICE VISIT (OUTPATIENT)
Age: 39
End: 2024-03-11
Payer: MEDICAID

## 2024-03-11 VITALS — TEMPERATURE: 98 F | BODY MASS INDEX: 28.52 KG/M2 | HEIGHT: 62 IN | WEIGHT: 155 LBS

## 2024-03-11 DIAGNOSIS — G56.01 RIGHT CARPAL TUNNEL SYNDROME: Primary | ICD-10-CM

## 2024-03-11 PROCEDURE — 99214 OFFICE O/P EST MOD 30 MIN: CPT | Performed by: ORTHOPAEDIC SURGERY

## 2024-03-11 NOTE — PROGRESS NOTES
Celsa Garcia is a 38 y.o. female right handed.  Worker's Compensation and legal considerations: none    Chief Complaint   Patient presents with    Hand Pain     Right hand pain     Pain Score:   3    HPI: Patient presents today for follow-up of Bilateral upper extremity EMG.  She reports continued nighttime numbness as well as occasional numbness and tingling during the day.    Initial HPI: Patient presents today as referral from spine due to bilateral hand numbness and tingling.  She is currently scheduled for an EMG.    Date of onset: Indeterminate  Injury: No  Prior Treatment:  No    ROS: Review of Systems - General ROS: negative except HPI    Past Medical History:   Diagnosis Date    ADHD     Anxiety     Depression        Past Surgical History:   Procedure Laterality Date    CHOLECYSTECTOMY          Current Outpatient Medications   Medication Sig Dispense Refill    amphetamine-dextroamphetamine (ADDERALL) 20 MG tablet Take 1 tablet by mouth every morning.      omeprazole 20 MG EC tablet Take 1 tablet by mouth daily (Patient not taking: Reported on 1/12/2024)      ibuprofen (ADVIL;MOTRIN) 200 MG tablet Take 1 tablet by mouth every 6 hours as needed for Pain      albuterol sulfate HFA (VENTOLIN HFA) 108 (90 Base) MCG/ACT inhaler Inhale 2 puffs into the lungs 4 times daily as needed (wheezing, sob, cough) 18 g 0    omeprazole (PRILOSEC OTC) 20 MG tablet Take 1 tablet by mouth daily 30 tablet 3    acetaminophen (TYLENOL) 500 MG tablet Take 1 tablet by mouth every 6 hours as needed for Pain 30 tablet 1    vitamin D (ERGOCALCIFEROL) 1.25 MG (15895 UT) CAPS capsule Take 1 capsule by mouth once a week 12 capsule 1    sertraline (ZOLOFT) 100 MG tablet Take 1 tablet by mouth daily Patient is taking 75 mg Q D      ALPRAZolam (XANAX) 0.5 MG tablet Take 1 tablet by mouth as needed for Sleep.       No current facility-administered medications for this visit.       Allergies   Allergen Reactions    Meperidine Nausea And

## 2024-03-22 ENCOUNTER — TELEMEDICINE (OUTPATIENT)
Age: 39
End: 2024-03-22
Payer: MEDICAID

## 2024-03-22 DIAGNOSIS — M47.812 CERVICAL SPONDYLOSIS WITHOUT MYELOPATHY: Primary | ICD-10-CM

## 2024-03-22 DIAGNOSIS — M54.12 CERVICAL NEURITIS: ICD-10-CM

## 2024-03-22 PROCEDURE — 99443 PR PHYS/QHP TELEPHONE EVALUATION 21-30 MIN: CPT | Performed by: NURSE PRACTITIONER

## 2024-03-22 NOTE — PROGRESS NOTES
Celsa Garcia is a 39 y.o. female evaluated via telephone on 3/22/2024 for Pain, Neck Pain, and Shoulder Pain  .      History of Present Illness: This is a patient who has neck pain and radiating left upper extremity pain.  She works at [employer] and her pain causes her to miss work on occasion.  Purpose of this visit is to fill out her FMLA paperwork.    Physical Exam: Patient is a 39-year-old female who is alert and oriented.  Spoke with fluency.  She did not appear to be in distress.      Assessment/Plan: This is a patient with cervical spondylosis and neuritis that gives her neck and arm pain.  We have filled out her form for her.   will review it and sign it.  Will let her know when it is ready for pickup.    Celsa was seen today for pain, neck pain and shoulder pain.    Diagnoses and all orders for this visit:    Cervical spondylosis without myelopathy    Cervical neuritis          Documentation:  I communicated with the patient and/or health care decision maker about neck and arm pain.   Details of this discussion including any medical advice provided: Yes    Total Time: minutes: 21-30 minutes    Celsa Garcia was evaluated through a synchronous (real-time) audio encounter. Patient identification was verified at the start of the visit. She (or guardian if applicable) is aware that this is a billable service, which includes applicable co-pays. This visit was conducted with the patient's (and/or legal guardian's) verbal consent. She has not had a related appointment within my department in the past 7 days or scheduled within the next 24 hours.   The patient was located at Home: [address] [place] [address].  The provider was located at Facility (Appt Dept): [address] [place] [address]  [address]  [place] [address].    I am licensed in the [facility]    Note: not billable if this call serves to triage the patient into an appointment for the relevant concern    BARB Meeks -

## 2024-03-22 NOTE — PROGRESS NOTES
Celsa Garcia presents today for   Chief Complaint   Patient presents with    Pain    Neck Pain    Shoulder Pain       Is someone accompanying this pt? no    Is the patient using any DME equipment during OV? no    Depression Screening:       No data to display                Learning Assessment:  Failed to redirect to the Timeline version of the [employer] Smart[employer].    Abuse Screening:       No data to display                Fall Risk  Failed to redirect to the Timeline version of the [employer] SmartLink.    OPIOID RISK TOOL  Failed to redirect to the Timeline version of the [employer] SmartLink.    Coordination of Care:  1. Have you been to the ER, urgent care clinic since your last visit? no  Hospitalized since your last visit? no    2. Have you seen or consulted any other health care providers outside of the [facility] since your last visit? no Include any pap smears or colon screening. no

## 2024-03-25 ENCOUNTER — TELEPHONE (OUTPATIENT)
Age: 39
End: 2024-03-25

## 2024-03-25 NOTE — TELEPHONE ENCOUNTER
Patient needs follow up appointment from 12/21/2023 visit.  Please call and schedule.    Return in about 3 months (around 3/21/2024) for follow up on chronic conditions, labs 1 week before next OV.

## 2024-04-16 ENCOUNTER — OFFICE VISIT (OUTPATIENT)
Age: 39
End: 2024-04-16
Payer: MEDICAID

## 2024-04-16 VITALS
WEIGHT: 155 LBS | HEART RATE: 83 BPM | TEMPERATURE: 98.7 F | BODY MASS INDEX: 28.52 KG/M2 | OXYGEN SATURATION: 98 % | HEIGHT: 62 IN

## 2024-04-16 DIAGNOSIS — G56.01 RIGHT CARPAL TUNNEL SYNDROME: ICD-10-CM

## 2024-04-16 DIAGNOSIS — M54.12 CERVICAL NEURITIS: ICD-10-CM

## 2024-04-16 DIAGNOSIS — S16.1XXD STRAIN OF NECK MUSCLE, SUBSEQUENT ENCOUNTER: ICD-10-CM

## 2024-04-16 DIAGNOSIS — M47.812 CERVICAL SPONDYLOSIS WITHOUT MYELOPATHY: Primary | ICD-10-CM

## 2024-04-16 PROCEDURE — 99214 OFFICE O/P EST MOD 30 MIN: CPT | Performed by: PHYSICAL MEDICINE & REHABILITATION

## 2024-04-16 NOTE — PROGRESS NOTES
(12/21/2023)    PRAPARE - Transportation     Lack of Transportation (Non-Medical): No   Housing Stability: Unknown (12/21/2023)    Housing Stability Vital Sign     Unstable Housing in the Last Year: No       Current Outpatient Medications   Medication Sig Dispense Refill    ibuprofen (ADVIL;MOTRIN) 200 MG tablet Take 1 tablet by mouth every 6 hours as needed for Pain      albuterol sulfate HFA (VENTOLIN HFA) 108 (90 Base) MCG/ACT inhaler Inhale 2 puffs into the lungs 4 times daily as needed (wheezing, sob, cough) 18 g 0    omeprazole (PRILOSEC OTC) 20 MG tablet Take 1 tablet by mouth daily 30 tablet 3    acetaminophen (TYLENOL) 500 MG tablet Take 1 tablet by mouth every 6 hours as needed for Pain 30 tablet 1    vitamin D (ERGOCALCIFEROL) 1.25 MG (80603 UT) CAPS capsule Take 1 capsule by mouth once a week 12 capsule 1    sertraline (ZOLOFT) 100 MG tablet Take 1 tablet by mouth daily Patient is taking 75 mg Q D      ALPRAZolam (XANAX) 0.5 MG tablet Take 1 tablet by mouth as needed for Sleep.      amphetamine-dextroamphetamine (ADDERALL) 20 MG tablet Take 1 tablet by mouth every morning. (Patient not taking: Reported on 3/22/2024)      omeprazole 20 MG EC tablet Take 1 tablet by mouth daily (Patient not taking: Reported on 1/12/2024)       No current facility-administered medications for this visit.       Allergies   Allergen Reactions    Meperidine Nausea And Vomiting     Other Reaction(s): gi distress    Meperidine Hcl Nausea Only          PHYSICAL EXAMINATION    Pulse 83   Temp 98.7 °F (37.1 °C) (Temporal)   Ht 1.575 m (5' 2\")   Wt 70.3 kg (155 lb)   SpO2 98%   BMI 28.35 kg/m²       CONSTITUTIONAL: NAD, A&O x 3    Byers's sign: Negative, Bilaterally    MOTOR:  Ambulates without an assistive device.     Shoulder AB/Flex Elbow Flex Wrist Ext Elbow Ext Wrist Flex Hand Intrin Tone   Right +4/5 +4/5 +4/5 +4/5 +4/5 +4/5 +4/5   Left +4/5 +4/5 +4/5 +4/5 +4/5 +4/5 +4/5     SENSATION: Decreased sensation to light touch

## 2024-05-06 ENCOUNTER — HOSPITAL ENCOUNTER (OUTPATIENT)
Facility: HOSPITAL | Age: 39
Discharge: HOME | End: 2024-05-09
Attending: PHYSICAL MEDICINE & REHABILITATION
Payer: MEDICAID

## 2024-05-06 DIAGNOSIS — S16.1XXD STRAIN OF NECK MUSCLE, SUBSEQUENT ENCOUNTER: ICD-10-CM

## 2024-05-06 DIAGNOSIS — M47.812 CERVICAL SPONDYLOSIS WITHOUT MYELOPATHY: ICD-10-CM

## 2024-05-06 DIAGNOSIS — G56.01 RIGHT CARPAL TUNNEL SYNDROME: ICD-10-CM

## 2024-05-06 DIAGNOSIS — M54.12 CERVICAL NEURITIS: ICD-10-CM

## 2024-05-06 PROCEDURE — 72141 MRI NECK SPINE W/O DYE: CPT

## 2024-05-07 DIAGNOSIS — K21.9 GASTRO-ESOPHAGEAL REFLUX DISEASE WITHOUT ESOPHAGITIS: ICD-10-CM

## 2024-05-07 RX ORDER — NICOTINE POLACRILEX 4 MG/1
20 GUM, CHEWING ORAL DAILY
Qty: 28 TABLET | Refills: 6 | OUTPATIENT
Start: 2024-05-07

## 2024-05-07 NOTE — TELEPHONE ENCOUNTER
Please inform the patient to come in for follow-up on chronic conditions.  Get the labs done a week before office visit.  Thanks

## 2024-05-11 DIAGNOSIS — M54.12 CERVICAL RADICULOPATHY: ICD-10-CM

## 2024-05-13 RX ORDER — PREDNISONE 10 MG/1
TABLET ORAL
Qty: 20 TABLET | Refills: 0 | OUTPATIENT
Start: 2024-05-13

## 2024-06-03 ENCOUNTER — TELEPHONE (OUTPATIENT)
Age: 39
End: 2024-06-03

## 2024-06-11 DIAGNOSIS — E55.9 VITAMIN D DEFICIENCY: ICD-10-CM

## 2024-06-11 RX ORDER — ERGOCALCIFEROL 1.25 MG/1
50000 CAPSULE ORAL WEEKLY
Qty: 4 CAPSULE | Refills: 0 | Status: SHIPPED | OUTPATIENT
Start: 2024-06-11

## 2024-07-14 NOTE — PROGRESS NOTES
[facility]  [address]  [place] [address]  Tel: [phone]  Fax: [phone]          PROGRESS NOTE      HISTORY OF PRESENT ILLNESS:  The patient is a 39 y.o. female and was seen today for follow up of  neck pain radiating to the left elbow with paresthesia from the elbow to the hand, involves digits 2-4. She also notes distal RUE symptoms. She says her neck pain started in November 2022 and her LUE symptoms started in November 2023. Patient says her pain has been holding steady since the initial onset. She denies loss of balance, falls, or impairments manual dexterity. Her pain is exacerbated by no position. Patient denies recent fevers, weight loss, rashes, or skin sores. No stomach ulcers or bleeding disorders.No history of spinal surgery or injections. Patient denies recent physical therapy or chiropractic care. Pt denies DM.Patient reports that to her knowledge her kidneys function properly, GFR over 60 on 5/22/2023. Patient says to her knowledge she is not pregnant, trying to become pregnant, or breast feeding at this time. Patient has taken prednisone x 2 since December 2023 without benefit, tylenol, and Motrin. PmHx of depression, anxiety. Note from Dr. Kramer dated 12/21/2023 indicating patient was seen with c/o left sided upper back pain radiating to the LUE to the hand. She has tried Motrin with some benefit. The previous CTS and right hand have improved. She was given prednisone. Referred to me. Cervical spine XR dated 12/11/2023 films independently reviewed by me. Per report, There is partial reversal of the cervical lordosis which may be positional or related to spasm. Degenerative changes are present with joint space narrowing and marginal osteophytic spurring. There is no prevertebral soft tissue swelling. No fractures or compression deformities are identified. There is no evidence of spondylolisthesis. A BUE EMG dated 2/16/2024 by

## 2024-07-15 ENCOUNTER — OFFICE VISIT (OUTPATIENT)
Age: 39
End: 2024-07-15
Payer: MEDICAID

## 2024-07-15 VITALS
HEART RATE: 98 BPM | HEIGHT: 62 IN | BODY MASS INDEX: 27.97 KG/M2 | WEIGHT: 152 LBS | TEMPERATURE: 98 F | OXYGEN SATURATION: 98 %

## 2024-07-15 DIAGNOSIS — M54.12 CERVICAL NEURITIS: Primary | ICD-10-CM

## 2024-07-15 DIAGNOSIS — M50.30 DDD (DEGENERATIVE DISC DISEASE), CERVICAL: ICD-10-CM

## 2024-07-15 DIAGNOSIS — M48.02 FORAMINAL STENOSIS OF CERVICAL REGION: ICD-10-CM

## 2024-07-15 PROCEDURE — 99214 OFFICE O/P EST MOD 30 MIN: CPT | Performed by: PHYSICAL MEDICINE & REHABILITATION

## 2024-07-15 RX ORDER — GABAPENTIN 100 MG/1
CAPSULE ORAL
Qty: 90 CAPSULE | Refills: 1 | Status: SHIPPED | OUTPATIENT
Start: 2024-07-15 | End: 2024-09-16

## 2024-07-15 RX ORDER — LAMOTRIGINE 25 MG/1
25 TABLET ORAL DAILY
COMMUNITY
Start: 2024-05-07

## 2025-06-02 DIAGNOSIS — K21.9 GASTROESOPHAGEAL REFLUX DISEASE, UNSPECIFIED WHETHER ESOPHAGITIS PRESENT: ICD-10-CM

## 2025-06-03 DIAGNOSIS — K21.9 GASTROESOPHAGEAL REFLUX DISEASE, UNSPECIFIED WHETHER ESOPHAGITIS PRESENT: ICD-10-CM

## 2025-06-03 RX ORDER — OMEPRAZOLE 20 MG/1
20 TABLET, DELAYED RELEASE ORAL DAILY
Qty: 90 TABLET | Refills: 2 | OUTPATIENT
Start: 2025-06-03